# Patient Record
(demographics unavailable — no encounter records)

---

## 2025-02-21 NOTE — HISTORY OF PRESENT ILLNESS
[de-identified] : (L) Shoulder pain [FreeTextEntry6] : Fell on her L shoulder ysterday skiing Now pain and decrease rom

## 2025-03-18 NOTE — REASON FOR VISIT
[Initial Evaluation] : an initial evaluation [Patient] : patient [Mother] : mother [FreeTextEntry1] : Left shoulder injury on 2/25/2025  while skiing

## 2025-03-18 NOTE — REVIEW OF SYSTEMS
[Change in Activity] : change in activity [Fever Above 102] : no fever [Itching] : no itching [Redness] : no redness [Sore Throat] : no sore throat [Vomiting] : no vomiting [Limping] : no limping [Joint Pains] : arthralgias

## 2025-03-18 NOTE — ASSESSMENT
[FreeTextEntry1] : Moon Is a 17-year-old girl who sustained a potentially a partial detached tearing of the anterior superior labrum.  Today's assessment was performed with the assistance of the patient's parent as an independent historian as the patient's history is unreliable.  The MRI of the left shoulder obtained from the outside facility were reviewed with both the parent and patient confirming a positive partially detached tearing of the anterior superior labrum versus a possible normal variant.  She also has a Para labral cyst.   The recommendation at this time would be to remain out of activities, do P.T. and follow up in 6 weeks. If there is no improvement in 6 weeks, we will refer to Dr. Lai for a 2nd opinion.  We had a thorough talk in regard to the diagnosis, prognosis and treatment modalities.  All questions and concerns were addressed today. There was a verbal understanding from the parents and patient.   MIRANDA Isidro have acted as a scribe and documented the above information for Dr. Hensley.   This note was generated using Dragon medical dictation software. A reasonable effort has been made for proofreading its contents; however, typos may still remain. If there are any questions or points of clarification needed, please do not hesitate to contact my office.   The above documentation completed by the scribe is an accurate record of both my words and actions.   Dr. Hensley.

## 2025-03-18 NOTE — HISTORY OF PRESENT ILLNESS
[FreeTextEntry1] : Moon is a 17 year old girl who fell while skiing in February landing directly onto her left shoulder resulting in moderate discomfort and limited range of motion.  She was initially evaluated by her PCP where x-rays were obtained which were negative.  She underwent an MRI which was an MRI which was obtained at a [facility] radiology facility on 3/7/2025 confirming a positive partially detached tearing of the anterior superior labrum versus a possible normal variant.  She also has a paralabral cyst.  She has not attempted physical therapy however she has continued to moderate discomfort and limited range of motion.

## 2025-03-18 NOTE — DATA REVIEWED
[de-identified] : Left shoulder MRI at [facility] on 3/7/25: suspected   partially detached tearing of the anterior superior labrum versus a possible normal variant.  She also has a Para labral cyst.

## 2025-03-18 NOTE — END OF VISIT
[FreeTextEntry3] : I, Fidencio Hensley MD, I personally performed the services described in the documentation, reviewed the documentation recorded by the scribe in my presence and it accurately and completely records my words and actions

## 2025-03-18 NOTE — PHYSICAL EXAM
[FreeTextEntry1] : Pleasant and cooperative with exam, appropriate for age. Ambulates without evidence of antalgia and limp, good coordination and balance. AAOX3  Skin: No rashes noted.  Eyes: Both conjunctiva, eyelids and pupils are present.  ENT:  Both ears, nose and lips are present. No nasal congestion.  Resp: No cough or wheezing noted.  Left shoulder: Range of motion: Passive forward flexion of 145 degrees with moderate discomfort within the anterior superior aspect of the joint..,  - Neer sign.  Extension 55 degrees with mild discomfort.  Internal rotation of 65 degrees with minimal discomfort, external rotation of 20 degrees with moderate discomfort.  There is no discomfort elicited with palpation over the clavicle, AC joint or humeral head.  Positive right positive apprehension sign.  Positive Grundy test with weakness and moderate discomfort comfort.  No crepitus clicking locking or popping noted.  4 5 muscle strength.  Neurologically intact with full sensation to palpation.

## 2025-04-03 NOTE — HISTORY OF PRESENT ILLNESS
[Y] : Patient is sexually active [N] : Patient denies prior pregnancies [Normal Amount/Duration] :  normal amount and duration [Regular Cycle Intervals] : periods have been regular [Frequency: Q ___ days] : menstrual periods occur approximately every [unfilled] days [Menarche Age: ____] : age at menarche was [unfilled] [No] : Patient does not have concerns regarding sex [Currently Active] : currently active [Men] : men [Condoms] : Condoms

## 2025-04-03 NOTE — PHYSICAL EXAM
[Oriented x3] : oriented x3 [Examination Of The Breasts] : a normal appearance [No Masses] : no breast masses were palpable [Labia Majora] : normal [Labia Minora] : normal [Normal] : normal [Uterine Adnexae] : normal